# Patient Record
Sex: FEMALE | Race: WHITE | NOT HISPANIC OR LATINO | Employment: OTHER
[De-identification: names, ages, dates, MRNs, and addresses within clinical notes are randomized per-mention and may not be internally consistent; named-entity substitution may affect disease eponyms.]

---

## 2023-10-18 ENCOUNTER — TRANSCRIBE ORDERS (OUTPATIENT)
Dept: OTHER | Age: 78
End: 2023-10-18

## 2023-10-18 ENCOUNTER — PATIENT OUTREACH (OUTPATIENT)
Dept: ONCOLOGY | Facility: CLINIC | Age: 78
End: 2023-10-18
Payer: COMMERCIAL

## 2023-10-18 DIAGNOSIS — C50.919 BREAST CANCER (H): ICD-10-CM

## 2023-10-18 DIAGNOSIS — C79.9 METASTATIC ADENOCARCINOMA (H): Primary | ICD-10-CM

## 2023-10-18 NOTE — PROGRESS NOTES
New Patient Oncology Nurse Navigator Note     Referring provider: Self/Daughter Carito      Referred to (specialty:) Medical Oncology      Date Referral Received: October 18, 2023     Evaluation for:    C79.9 (ICD-10-CM) - Metastatic adenocarcinoma (H)  C50.919 (ICD-10-CM) - Breast cancer (H)    Clinical History (per Nurse review of records provided):      Status post bilateral breast implants, 1985 2012  She has a history of right breast cancer, status post right mastectomy in 2012 with surgical reconstruction.     4/19/12 - Specimen #[id number]  Breast, right, 3:00, needle core biopsy:   INVASIVE DUCTAL CARCINOMA, COMBINED HAMIDA GRADE 1.     4/30/12 - Specimen #[id number]  Right simple mastectomy with immediate surgical reconstruction and sentinel node biopsy on 04/30/2012 (Dr. Sam Schwartz)   A)       Right sentinel lymph node #1, resection:   NEGATIVE FOR METASTATIC BREAST CARCINOMA.  CONFIRMED BY NEGATIVE PAN   KERATIN STAIN.   B)       Right sentinel lymph node #2, resection:   NEGATIVE FOR METASTATIC BREAST CARCINOMA.  CONFIRMED BY NEGATIVE PAN   KERATIN STAIN.   C)       Right breast with implant, mastectomy:   INVASIVE DUCTAL CARCINOMA, HAMIDA GRADE 1, PRESENT AT SITE OF PRIOR   BIOPSY ([id number]).  SURGICAL MARGINS NEGATIVE FOR TUMOR, SEE COMMENT AND GRID.   Tumor size: 1 x 1 x 1 cm (estimated from histologic sections).   [place] ndgndrndanddndend:nd nd2nd. Surgical margins: Negative.  Tumor extends to 0.4 cm of the superior margin.   Additional superior margin(specimen D)measures 0.5 cm in addition to the 0.4   giving a true negative margin of 0.9 cm.   Peritumoral angiolymphatic invasion: Negative.   Carcinoma in-situ: Negative.   Reparative changes of prior biopsy: Positive.   Skin: Negative.   Nipple:  Negative.   Total lymph nodes: 2.   Positive lymph nodes: 0.   ER status:  Pending.   [place] status: Pending.   Her-2/Fred status:  Pending.   Additional findings: Tumor did not involve the capsule of the breast  implant.   Pathologic AJCC Stage: T1 N0 MX   D)      Right breast, superior margin, resection:   NEGATIVE FOR BREAST CARCINOMA.   Surgery consult, operative, and progress notes needed    Was treated with 7 years of letrozole and exemestane. Was last seen in medical oncology November 2020.  Medical oncology consult and progress notes needed.    2023    Left breast screening mammogram on 07/31/2023 BI-RADS: 1: Negative.    The patient first noticed a lump in her right axilla in mid August 2023. This mass is nontender. No overlying skin changes. No breast pain. No masses or lumps noted in her breasts. No overlying skin changes. No left nipple discharge. She was seen in primary care for her concern.     Right axillary ultrasound was done on 10/03/2023 with concerns for suspicious axillary lymphadenopathy. Impression: Suspicious axillary lymphadenopathy. Ultrasound-guided biopsy is recommended. RECOMMENDATION: Biopsy ASSESSMENT: BI-RADS: 4: Suspicious.  Ultrasound-guided biopsy was recommended.     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   Estrogen Receptor (ER) Status:   Positive (%, strong)   Progesterone Receptor (PgR) Status:   Negative (less than 1%).   HER2 by Immunohistochemistry:   Equivocal (score 2+).  Will be sent for FISH.   HER2 by FISH subsequently reported as negative.    10/18/23 - PET scan  IMPRESSION:   1. Biopsy-proven right axillary FDG avid lymphangitic metastases.   2.  FDG avid left thyroid nodule.  Recommend ultrasound for further evaluation.   3.  No evidence of distant metastatic disease throughout the remainder of the body.     10/23/23 - genetic counseling with Vivian [facility] ([place]). Kena elected to pursue the STAT breast with reflex to the breast, prostate, and CNS/nervous system panels through [facility].  10/24/23 -  Surgery consult with Dr. Sethi  10/24/23 - MRI  10/25/23 - Medical oncology with Dr. Vega    10/24/23 - Bilateral breast MRI  RIGHT BREAST:  No MRI findings of malignancy in the right breast. Right mastectomy with right   silicone implant. No evidence of rupture.     RIGHT AXILLA:  Enlarged level 1 and 2 right axillary lymph nodes best seen on series 702 image 114   measuring 4.1 x 2.6 cm and 2.6 x 3.5 cm respectively. Enhancement anterior lateral to the enlarged   right axillary lymph node seen on series 702 image 111 concerning for extracapsular extension.     LEFT BREAST:  No MRI findings of malignancy in the left breast. Reconstruction mammoplasty with left   silicone implant. No evidence of rupture.     LEFT AXILLA:  No left axillary lymphadenopathy.      CHEST WALL:  No internal mammary lymphadenopathy.       Self referral for 2nd opinion of metastatic adenocarcinoma consistent with breast cancer. Medical records are at [address], [address]. Ph. [phone] Referral from telephone call with the patient daughter Carito.    Records Location: Care Everywhere, Media, and See Bookmarked material       10/19 15:54 - Telephoned and left voice message for daughter Carito Jamison requesting call back to assist in scheduling.    10/20 14:13 - Telephoned and spoke with Victorina herself. She is interested in second opinion with [facility] providers.     10/20 16:13 - Telephoned and reassured Victorina we are working on consults with both medical oncology and surgery. Writer will connect again when we have identified consult times but we are shooting for 11/2. She is very busy with appointments next week.     Patient lives in [place], [place], but daughter lives in [place], so she is happy to be in [facility] if virtual appointment available.     10/24 11:19 - Consult with Dr. Valdivia has been scheduled for 11/2. Telephoned and spoke with patient's  to assist in  scheduling surgery consult. Patient is currently undergoing MRI. Writer will follow up later.     10/24 15:05 - Telephoned and left voice message for Victorina informing her a consult with Dr. Monalisa Ferguson on 11/2 at noon is on hold for her. She may call New Patient Scheduling directly or they will contact her. Calls welcomed with questions.     11/12 - Patient was no show to her 11/6 consult with Dr. Valdivia. Other appointments with Dr. Valdivia and Dr. Ferguson have been cancelled (no indicator evident to writer). She has proceeded with care at [facility] and will likely do care in [place] managed by [facility]. Referral will be rejected for now and can be reopened at any time if patient would like to pursue care here.

## 2023-10-24 ENCOUNTER — TRANSCRIBE ORDERS (OUTPATIENT)
Dept: ONCOLOGY | Facility: CLINIC | Age: 78
End: 2023-10-24
Payer: COMMERCIAL

## 2023-10-24 DIAGNOSIS — C50.919 BREAST CA (H): Primary | ICD-10-CM

## 2023-11-02 ENCOUNTER — ANCILLARY PROCEDURE (OUTPATIENT)
Dept: RADIOLOGY | Facility: CLINIC | Age: 78
End: 2023-11-02
Payer: COMMERCIAL

## 2023-11-02 NOTE — TELEPHONE ENCOUNTER
RECORDS STATUS - BREAST    RECORDS REQUESTED FROM: [facility]   DATE REQUESTED:    NOTES DETAILS STATUS   OFFICE NOTE from medical oncologist [facility] Dr. Sebastián Vega: 11/2/15 - 10/25/23    Racheal Ordonez, APRN - CNP: 10/15/18 - 20    Dr. Naz Haro: 12 - 5/1/15    Dr. Sami Smith: 5/15/12 - 12   OFFICE NOTE from surgeon [facility] Dr. Ny Shah: 10/24/23    Dr. Reynaldo Najera: 12 - 13   OPERATIVE REPORT [facility] 12: Mastectomy  12: Right Axillary [place] Node Bx  12:    MEDICATION LIST [facility]   LABS     PATHOLOGY REPORTS  (Tissue diagnosis, Stage, ER/VA percentage positive and intensity of staining, HER2 IHC, FISH, and all biopsies from breast and any distant metastasis)                 [facility], Reports in CE, slides requested   [facility] 10/6/23: [id number]  12: [id number]  12: [id number]    Not requested per report  12: [id number]   GENONOMIC TESTING     TYPE:   (Next Generation Sequencing, including Foundation One testing, and Oncotype score) [facility] 10/23/23: [facility]   IMAGING (NEED IMAGES & REPORT)     MRI PACS 10/24/23: [facility]   MAMMO PACS 3/29/10 - 10/6/23: [facility]   ULTRASOUND PACS 11 - 23: [facility]   PET PACS 10/18/23: [facility]

## 2023-11-06 ENCOUNTER — PRE VISIT (OUTPATIENT)
Dept: ONCOLOGY | Facility: CLINIC | Age: 78
End: 2023-11-06
Payer: COMMERCIAL